# Patient Record
Sex: FEMALE | Race: WHITE | NOT HISPANIC OR LATINO
[De-identification: names, ages, dates, MRNs, and addresses within clinical notes are randomized per-mention and may not be internally consistent; named-entity substitution may affect disease eponyms.]

---
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
## 2021-09-19 NOTE — ED PEDIATRIC TRIAGE NOTE - CHIEF COMPLAINT QUOTE
Goal Outcome Evaluation:  Plan of Care Reviewed With: patient        Progress: no change  Outcome Summary: Patient up in chair entire shift, al to BSD, prn pain med given, patient denies nausea, NG came out this am, patient refused at thi9s time to have replaced after attempt, safety maintained.   pt has hx of depression, BPD, states "having difficulty trying to do things, thoughts of wanting to hurt myself, I have access to lots of meds to hurt myself, looking for a way out" has had attempts of harm in the past, pt admits to smoking weed